# Patient Record
Sex: FEMALE | URBAN - METROPOLITAN AREA
[De-identification: names, ages, dates, MRNs, and addresses within clinical notes are randomized per-mention and may not be internally consistent; named-entity substitution may affect disease eponyms.]

---

## 2023-05-25 ENCOUNTER — LAB OUTSIDE AN ENCOUNTER (OUTPATIENT)
Dept: URBAN - METROPOLITAN AREA CLINIC 27 | Facility: CLINIC | Age: 18
End: 2023-05-25

## 2023-05-25 ENCOUNTER — OFFICE VISIT (OUTPATIENT)
Dept: URBAN - METROPOLITAN AREA CLINIC 27 | Facility: CLINIC | Age: 18
End: 2023-05-25
Payer: COMMERCIAL

## 2023-05-25 VITALS
HEART RATE: 85 BPM | SYSTOLIC BLOOD PRESSURE: 123 MMHG | DIASTOLIC BLOOD PRESSURE: 85 MMHG | HEIGHT: 64 IN | BODY MASS INDEX: 27.31 KG/M2 | WEIGHT: 160 LBS

## 2023-05-25 DIAGNOSIS — R11.2 NAUSEA AND VOMITING IN ADULT: ICD-10-CM

## 2023-05-25 DIAGNOSIS — K62.5 RECTAL BLEEDING: ICD-10-CM

## 2023-05-25 PROCEDURE — 99204 OFFICE O/P NEW MOD 45 MIN: CPT | Performed by: INTERNAL MEDICINE

## 2023-05-25 RX ORDER — ESCITALOPRAM OXALATE 5 MG/1
1 TABLET TABLET, FILM COATED ORAL
Status: ACTIVE | COMMUNITY

## 2023-05-25 RX ORDER — METOCLOPRAMIDE HYDROCHLORIDE 10 MG/1
1 TABLET BEFORE MEALS TABLET ORAL
Status: ACTIVE | COMMUNITY

## 2023-05-25 RX ORDER — ETONOGESTREL AND ETHINYL ESTRADIOL .12; .015 MG/D; MG/D
1 RING LEAVE IN PLACE FOR 3 WEEKS, REMOVE, AND REPLACE WITH A NEW RING AFTER 7 DAY BREAK INSERT, EXTENDED RELEASE VAGINAL
Status: ACTIVE | COMMUNITY

## 2023-05-25 RX ORDER — ONDANSETRON 4 MG/1
1 TABLET ON THE TONGUE AND ALLOW TO DISSOLVE TABLET, ORALLY DISINTEGRATING ORAL ONCE A DAY
Status: ACTIVE | COMMUNITY

## 2023-05-25 NOTE — HPI-TODAY'S VISIT:
17-year-old female here for abdominal pain, nausea, vomiting, rectal bleeding.  She has had abdominal pain for about 3 years.  Pain is generalized, but worse in the lower abdomen.  More recently, she has developed diarrhea with blood and mucus in the stool.  She has lost about 10 pounds unintentionally.  She also has frequent nausea and vomiting.  She had recent labs that were normal.  Family history is notable for uncle with Crohn's disease and a grandmother with colon cancer.

## 2023-06-19 ENCOUNTER — OFFICE VISIT (OUTPATIENT)
Dept: URBAN - METROPOLITAN AREA MEDICAL CENTER 8 | Facility: MEDICAL CENTER | Age: 18
End: 2023-06-19
Payer: COMMERCIAL

## 2023-06-19 DIAGNOSIS — K62.5 ANAL BLEEDING: ICD-10-CM

## 2023-06-19 DIAGNOSIS — K29.60 ADENOPAPILLOMATOSIS GASTRICA: ICD-10-CM

## 2023-06-19 DIAGNOSIS — K31.A11 INTESTINAL METAPLASIA OF ANTRUM OF STOMACH WITHOUT DYSPLASIA: ICD-10-CM

## 2023-06-19 PROCEDURE — 45378 DIAGNOSTIC COLONOSCOPY: CPT | Performed by: INTERNAL MEDICINE

## 2023-06-19 PROCEDURE — 43239 EGD BIOPSY SINGLE/MULTIPLE: CPT | Performed by: INTERNAL MEDICINE

## 2023-07-06 ENCOUNTER — OFFICE VISIT (OUTPATIENT)
Dept: URBAN - METROPOLITAN AREA CLINIC 27 | Facility: CLINIC | Age: 18
End: 2023-07-06
Payer: COMMERCIAL

## 2023-07-06 ENCOUNTER — LAB OUTSIDE AN ENCOUNTER (OUTPATIENT)
Dept: URBAN - METROPOLITAN AREA CLINIC 27 | Facility: CLINIC | Age: 18
End: 2023-07-06

## 2023-07-06 VITALS
WEIGHT: 161 LBS | BODY MASS INDEX: 27.49 KG/M2 | HEART RATE: 109 BPM | SYSTOLIC BLOOD PRESSURE: 120 MMHG | DIASTOLIC BLOOD PRESSURE: 109 MMHG | HEIGHT: 64 IN

## 2023-07-06 DIAGNOSIS — R11.2 ACUTE NAUSEA WITH NONBILIOUS VOMITING: ICD-10-CM

## 2023-07-06 DIAGNOSIS — R19.7 ACUTE DIARRHEA: ICD-10-CM

## 2023-07-06 PROCEDURE — 99214 OFFICE O/P EST MOD 30 MIN: CPT | Performed by: INTERNAL MEDICINE

## 2023-07-06 RX ORDER — METOCLOPRAMIDE HYDROCHLORIDE 10 MG/1
1 TABLET BEFORE MEALS TABLET ORAL
Status: ACTIVE | COMMUNITY

## 2023-07-06 RX ORDER — DICYCLOMINE HYDROCHLORIDE 10 MG/1
1-2 CAPSULES CAPSULE ORAL
Qty: 90 | Refills: 3 | OUTPATIENT
Start: 2023-07-06 | End: 2023-11-03

## 2023-07-06 RX ORDER — ONDANSETRON 4 MG/1
1 TABLET ON THE TONGUE AND ALLOW TO DISSOLVE TABLET, ORALLY DISINTEGRATING ORAL ONCE A DAY
Status: ACTIVE | COMMUNITY

## 2023-07-06 RX ORDER — ESCITALOPRAM OXALATE 5 MG/1
1 TABLET TABLET, FILM COATED ORAL
Status: ACTIVE | COMMUNITY

## 2023-07-06 RX ORDER — ETONOGESTREL AND ETHINYL ESTRADIOL .12; .015 MG/D; MG/D
1 RING LEAVE IN PLACE FOR 3 WEEKS, REMOVE, AND REPLACE WITH A NEW RING AFTER 7 DAY BREAK INSERT, EXTENDED RELEASE VAGINAL
Status: ACTIVE | COMMUNITY

## 2023-07-07 ENCOUNTER — LAB OUTSIDE AN ENCOUNTER (OUTPATIENT)
Dept: URBAN - METROPOLITAN AREA CLINIC 27 | Facility: CLINIC | Age: 18
End: 2023-07-07

## 2023-07-12 LAB
A/G RATIO: 1.6
ABSOLUTE BASOPHILS: 52
ABSOLUTE EOSINOPHILS: 72
ABSOLUTE LYMPHOCYTES: 1567
ABSOLUTE MONOCYTES: 423
ABSOLUTE NEUTROPHILS: 4388
ALBUMIN: 4.2
ALKALINE PHOSPHATASE: 75
ALT (SGPT): 20
ANCA SCREEN: NEGATIVE
AST (SGOT): 25
BASOPHILS: 0.8
BILIRUBIN, TOTAL: 0.3
BUN/CREATININE RATIO: (no result)
BUN: 9
CALCIUM: 9.5
CARBON DIOXIDE, TOTAL: 25
CHLORIDE: 103
CREATININE: 0.7
EGFR: 127
EOSINOPHILS: 1.1
GLOBULIN, TOTAL: 2.6
GLUCOSE: 83
HEMATOCRIT: 44.5
HEMOGLOBIN: 14.9
IMMUNOGLOBULIN A, QN, SERUM: 152
INTERPRETATION: (no result)
LYMPHOCYTES: 24.1
MCH: 28.2
MCHC: 33.5
MCV: 84.3
MONOCYTES: 6.5
MPV: 10
MYELOPEROXIDASE ANTIBODY: <1
NEUTROPHILS: 67.5
PLATELET COUNT: 257
POTASSIUM: 4.5
PROTEIN, TOTAL: 6.8
PROTEINASE-3 ANTIBODY: <1
RDW: 13.2
RED BLOOD CELL COUNT: 5.28
SACCHAROMYCES CEREVISIAE AB (ASCA) (IGA): 29.5
SACCHAROMYCES CEREVISIAE AB (ASCA) (IGG): 32.2
SODIUM: 139
T-TRANSGLUTAMINASE (TTG) IGA: <1
TSH: 0.85
WHITE BLOOD CELL COUNT: 6.5

## 2023-07-14 LAB
CLOSTRIDIUM DIFFICILE: (no result)
FECAL FAT, QUALITATIVE: NORMAL
LEUKOCYTES: (no result)
OVA AND PARASITES, CONC AND PERM SMEAR: (no result)
PANCREATIC ELASTASE, FECAL: >500

## 2023-08-17 ENCOUNTER — LAB OUTSIDE AN ENCOUNTER (OUTPATIENT)
Dept: URBAN - METROPOLITAN AREA CLINIC 27 | Facility: CLINIC | Age: 18
End: 2023-08-17

## 2023-08-17 ENCOUNTER — OFFICE VISIT (OUTPATIENT)
Dept: URBAN - METROPOLITAN AREA CLINIC 27 | Facility: CLINIC | Age: 18
End: 2023-08-17
Payer: COMMERCIAL

## 2023-08-17 VITALS
HEIGHT: 64 IN | RESPIRATION RATE: 17 BRPM | SYSTOLIC BLOOD PRESSURE: 107 MMHG | HEART RATE: 84 BPM | DIASTOLIC BLOOD PRESSURE: 80 MMHG | WEIGHT: 159 LBS | BODY MASS INDEX: 27.14 KG/M2

## 2023-08-17 DIAGNOSIS — R19.7 ACUTE DIARRHEA: ICD-10-CM

## 2023-08-17 PROCEDURE — 99214 OFFICE O/P EST MOD 30 MIN: CPT | Performed by: INTERNAL MEDICINE

## 2023-08-17 RX ORDER — ESCITALOPRAM OXALATE 5 MG/1
1 TABLET TABLET, FILM COATED ORAL
Status: ACTIVE | COMMUNITY

## 2023-08-17 RX ORDER — DICYCLOMINE HYDROCHLORIDE 10 MG/1
1-2 CAPSULES CAPSULE ORAL
Qty: 90 | Refills: 3 | Status: ACTIVE | COMMUNITY
Start: 2023-07-06 | End: 2023-11-03

## 2023-08-17 RX ORDER — PREDNISONE 10 MG/1
AS DIRECTED TABLET ORAL ONCE A DAY
Qty: 50 | Refills: 0 | OUTPATIENT
Start: 2023-08-17 | End: 2023-09-06

## 2023-08-17 RX ORDER — ETONOGESTREL AND ETHINYL ESTRADIOL .12; .015 MG/D; MG/D
1 RING LEAVE IN PLACE FOR 3 WEEKS, REMOVE, AND REPLACE WITH A NEW RING AFTER 7 DAY BREAK INSERT, EXTENDED RELEASE VAGINAL
Status: ACTIVE | COMMUNITY

## 2023-08-17 RX ORDER — METOCLOPRAMIDE HYDROCHLORIDE 10 MG/1
1 TABLET BEFORE MEALS TABLET ORAL
Status: ACTIVE | COMMUNITY

## 2023-08-17 RX ORDER — ONDANSETRON 4 MG/1
1 TABLET ON THE TONGUE AND ALLOW TO DISSOLVE TABLET, ORALLY DISINTEGRATING ORAL ONCE A DAY
Status: ACTIVE | COMMUNITY

## 2023-08-17 NOTE — HPI-TODAY'S VISIT:
18-year-old female here for follow-up of diarrhea, rectal bleeding.  She had an EGD and colonoscopy that were unremarkable.  Labs were significant for elevated anti-Saccharomyces cerivasea antibody level.  She does have a family history of Crohn's disease.

## 2023-08-31 ENCOUNTER — CLAIMS CREATED FROM THE CLAIM WINDOW (OUTPATIENT)
Dept: URBAN - METROPOLITAN AREA CLINIC 27 | Facility: CLINIC | Age: 18
End: 2023-08-31
Payer: COMMERCIAL

## 2023-08-31 ENCOUNTER — OFFICE VISIT (OUTPATIENT)
Dept: URBAN - METROPOLITAN AREA CLINIC 27 | Facility: CLINIC | Age: 18
End: 2023-08-31

## 2023-08-31 ENCOUNTER — CLAIMS CREATED FROM THE CLAIM WINDOW (OUTPATIENT)
Dept: URBAN - METROPOLITAN AREA CLINIC 27 | Facility: CLINIC | Age: 18
End: 2023-08-31

## 2023-08-31 DIAGNOSIS — R19.7 ACUTE DIARRHEA: ICD-10-CM

## 2023-08-31 DIAGNOSIS — R10.84 ABDOMINAL CRAMPING, GENERALIZED: ICD-10-CM

## 2023-08-31 PROCEDURE — 91110 GI TRC IMG INTRAL ESOPH-ILE: CPT | Performed by: INTERNAL MEDICINE

## 2023-08-31 RX ORDER — PREDNISONE 10 MG/1
AS DIRECTED TABLET ORAL ONCE A DAY
Qty: 50 | Refills: 0 | Status: ACTIVE | COMMUNITY
Start: 2023-08-17 | End: 2023-09-06

## 2023-08-31 RX ORDER — METOCLOPRAMIDE HYDROCHLORIDE 10 MG/1
1 TABLET BEFORE MEALS TABLET ORAL
Status: ACTIVE | COMMUNITY

## 2023-08-31 RX ORDER — ETONOGESTREL AND ETHINYL ESTRADIOL .12; .015 MG/D; MG/D
1 RING LEAVE IN PLACE FOR 3 WEEKS, REMOVE, AND REPLACE WITH A NEW RING AFTER 7 DAY BREAK INSERT, EXTENDED RELEASE VAGINAL
Status: ACTIVE | COMMUNITY

## 2023-08-31 RX ORDER — ESCITALOPRAM OXALATE 5 MG/1
1 TABLET TABLET, FILM COATED ORAL
Status: ACTIVE | COMMUNITY

## 2023-08-31 RX ORDER — ONDANSETRON 4 MG/1
1 TABLET ON THE TONGUE AND ALLOW TO DISSOLVE TABLET, ORALLY DISINTEGRATING ORAL ONCE A DAY
Status: ACTIVE | COMMUNITY

## 2023-08-31 RX ORDER — DICYCLOMINE HYDROCHLORIDE 10 MG/1
1-2 CAPSULES CAPSULE ORAL
Qty: 90 | Refills: 3 | Status: ACTIVE | COMMUNITY
Start: 2023-07-06 | End: 2023-11-03

## 2023-09-26 ENCOUNTER — TELEPHONE ENCOUNTER (OUTPATIENT)
Dept: URBAN - METROPOLITAN AREA CLINIC 27 | Facility: CLINIC | Age: 18
End: 2023-09-26

## 2023-10-05 ENCOUNTER — OFFICE VISIT (OUTPATIENT)
Dept: URBAN - METROPOLITAN AREA CLINIC 27 | Facility: CLINIC | Age: 18
End: 2023-10-05
Payer: COMMERCIAL

## 2023-10-05 ENCOUNTER — DASHBOARD ENCOUNTERS (OUTPATIENT)
Age: 18
End: 2023-10-05

## 2023-10-05 VITALS
SYSTOLIC BLOOD PRESSURE: 127 MMHG | WEIGHT: 145 LBS | HEART RATE: 97 BPM | DIASTOLIC BLOOD PRESSURE: 88 MMHG | BODY MASS INDEX: 24.75 KG/M2 | HEIGHT: 64 IN

## 2023-10-05 DIAGNOSIS — K58.0 IRRITABLE BOWEL SYNDROME WITH DIARRHEA: ICD-10-CM

## 2023-10-05 PROBLEM — 197125005: Status: ACTIVE | Noted: 2023-10-05

## 2023-10-05 PROCEDURE — 99214 OFFICE O/P EST MOD 30 MIN: CPT | Performed by: INTERNAL MEDICINE

## 2023-10-05 RX ORDER — ONDANSETRON 4 MG/1
1 TABLET ON THE TONGUE AND ALLOW TO DISSOLVE TABLET, ORALLY DISINTEGRATING ORAL ONCE A DAY
Status: ACTIVE | COMMUNITY

## 2023-10-05 RX ORDER — ETONOGESTREL AND ETHINYL ESTRADIOL .12; .015 MG/D; MG/D
1 RING LEAVE IN PLACE FOR 3 WEEKS, REMOVE, AND REPLACE WITH A NEW RING AFTER 7 DAY BREAK INSERT, EXTENDED RELEASE VAGINAL
Status: ACTIVE | COMMUNITY

## 2023-10-05 RX ORDER — DICYCLOMINE HYDROCHLORIDE 10 MG/1
1-2 CAPSULES CAPSULE ORAL
Qty: 90 | Refills: 3 | Status: ACTIVE | COMMUNITY
Start: 2023-07-06 | End: 2023-11-03

## 2023-10-05 RX ORDER — METOCLOPRAMIDE HYDROCHLORIDE 10 MG/1
1 TABLET BEFORE MEALS TABLET ORAL
Status: ACTIVE | COMMUNITY

## 2023-10-05 RX ORDER — ESCITALOPRAM OXALATE 5 MG/1
1 TABLET TABLET, FILM COATED ORAL
Status: ACTIVE | COMMUNITY

## 2023-10-05 RX ORDER — RIFAXIMIN 550 MG/1
1 TABLET TABLET ORAL THREE TIMES A DAY
Qty: 42 TABLET | Refills: 2 | OUTPATIENT
Start: 2023-10-05 | End: 2023-11-15

## 2023-10-05 NOTE — HPI-TODAY'S VISIT:
18-year-old female here for abdominal pain, diarrhea.  She had an unremarkable EGD and colonoscopy.  Labs were all normal except for a positive ASCA.  Capsule endoscopy was unremarkable.  She has tried dicyclomine without much improvement in symptoms.  She is on Lexapro and more recently BuSpar for depression and anxiety.